# Patient Record
Sex: FEMALE | Race: ASIAN | Employment: FULL TIME | URBAN - METROPOLITAN AREA
[De-identification: names, ages, dates, MRNs, and addresses within clinical notes are randomized per-mention and may not be internally consistent; named-entity substitution may affect disease eponyms.]

---

## 2019-03-15 ENCOUNTER — HOSPITAL ENCOUNTER (EMERGENCY)
Facility: HOSPITAL | Age: 44
Discharge: HOME | End: 2019-03-15
Attending: EMERGENCY MEDICINE
Payer: COMMERCIAL

## 2019-03-15 ENCOUNTER — APPOINTMENT (OUTPATIENT)
Dept: GENERAL RADIOLOGY | Facility: HOSPITAL | Age: 44
End: 2019-03-15
Payer: COMMERCIAL

## 2019-03-15 VITALS
BODY MASS INDEX: 31.28 KG/M2 | DIASTOLIC BLOOD PRESSURE: 85 MMHG | HEART RATE: 94 BPM | TEMPERATURE: 98 F | SYSTOLIC BLOOD PRESSURE: 126 MMHG | HEIGHT: 62 IN | OXYGEN SATURATION: 99 % | RESPIRATION RATE: 18 BRPM | WEIGHT: 170 LBS

## 2019-03-15 DIAGNOSIS — S92.501A CLOSED FRACTURE OF PHALANX OF RIGHT FIFTH TOE, INITIAL ENCOUNTER: Primary | ICD-10-CM

## 2019-03-15 PROCEDURE — 28510 TREATMENT OF TOE FRACTURE: CPT

## 2019-03-15 PROCEDURE — 99284 EMERGENCY DEPT VISIT MOD MDM: CPT

## 2019-03-15 PROCEDURE — 73660 X-RAY EXAM OF TOE(S): CPT | Performed by: EMERGENCY MEDICINE

## 2019-03-15 RX ORDER — IBUPROFEN 600 MG/1
600 TABLET ORAL ONCE
Status: DISCONTINUED | OUTPATIENT
Start: 2019-03-15 | End: 2019-03-15

## 2019-03-15 RX ORDER — MELOXICAM 7.5 MG/1
7.5 TABLET ORAL DAILY
Qty: 14 TABLET | Refills: 0 | Status: SHIPPED | OUTPATIENT
Start: 2019-03-15 | End: 2019-03-29

## 2019-03-15 RX ORDER — IBUPROFEN 400 MG/1
400 TABLET ORAL ONCE
Status: COMPLETED | OUTPATIENT
Start: 2019-03-15 | End: 2019-03-15

## 2019-03-16 NOTE — ED PROVIDER NOTES
Patient Seen in: [facility] AND [facility] Emergency Department    History   Patient presents with:  Lower Extremity Injury (musculoskeletal)    Stated Complaint: r foot injury     HPI    36 yo F without PMH presenting for evaluation of right fifth toe pain after subungual hematoma noted. Neurological: Alert. RLE proximally and distally with 5/5 strength. Skin: Skin is warm. Psychiatric: Cooperative. Nursing note and vitals reviewed.         ED Course   Labs Reviewed - No data to display  Xr Toe(s) (min 2 Views 3/15/2019 10:42 PM    START taking these medications    Meloxicam 7.5 MG Oral Tab  Take 1 tablet (7.5 mg total) by mouth daily for 14 days. Avoid motrin(ibuprofen) and aleve(naproxen) while taking this medication. , Print Script, Disp-14 tablet, R-0

## 2019-06-12 ENCOUNTER — OFFICE VISIT (OUTPATIENT)
Dept: INTERNAL MEDICINE CLINIC | Facility: CLINIC | Age: 44
End: 2019-06-12
Payer: COMMERCIAL

## 2019-06-12 ENCOUNTER — LAB ENCOUNTER (OUTPATIENT)
Dept: LAB | Age: 44
End: 2019-06-12
Attending: INTERNAL MEDICINE
Payer: COMMERCIAL

## 2019-06-12 VITALS
HEIGHT: 62 IN | WEIGHT: 175.69 LBS | SYSTOLIC BLOOD PRESSURE: 102 MMHG | HEART RATE: 60 BPM | DIASTOLIC BLOOD PRESSURE: 70 MMHG | TEMPERATURE: 98 F | RESPIRATION RATE: 18 BRPM | BODY MASS INDEX: 32.33 KG/M2

## 2019-06-12 DIAGNOSIS — H91.93 DECREASED HEARING OF BOTH EARS: ICD-10-CM

## 2019-06-12 DIAGNOSIS — H90.6 MIXED HEARING LOSS, BILATERAL: ICD-10-CM

## 2019-06-12 DIAGNOSIS — Z12.31 SCREENING MAMMOGRAM, ENCOUNTER FOR: ICD-10-CM

## 2019-06-12 DIAGNOSIS — N92.6 IRREGULAR MENSES: ICD-10-CM

## 2019-06-12 DIAGNOSIS — E04.9 GOITER: ICD-10-CM

## 2019-06-12 DIAGNOSIS — Z00.00 PHYSICAL EXAM: ICD-10-CM

## 2019-06-12 DIAGNOSIS — Z00.00 PHYSICAL EXAM: Primary | ICD-10-CM

## 2019-06-12 PROCEDURE — 84443 ASSAY THYROID STIM HORMONE: CPT

## 2019-06-12 PROCEDURE — 80053 COMPREHEN METABOLIC PANEL: CPT

## 2019-06-12 PROCEDURE — 84703 CHORIONIC GONADOTROPIN ASSAY: CPT

## 2019-06-12 PROCEDURE — 99386 PREV VISIT NEW AGE 40-64: CPT | Performed by: INTERNAL MEDICINE

## 2019-06-12 PROCEDURE — 99212 OFFICE O/P EST SF 10 MIN: CPT | Performed by: INTERNAL MEDICINE

## 2019-06-12 PROCEDURE — 80061 LIPID PANEL: CPT

## 2019-06-12 PROCEDURE — 36415 COLL VENOUS BLD VENIPUNCTURE: CPT

## 2019-06-12 PROCEDURE — 85025 COMPLETE CBC W/AUTO DIFF WBC: CPT

## 2019-06-12 PROCEDURE — 81001 URINALYSIS AUTO W/SCOPE: CPT

## 2019-06-12 RX ORDER — FLUCONAZOLE 150 MG/1
150 TABLET ORAL ONCE
Qty: 1 TABLET | Refills: 1 | Status: SHIPPED | OUTPATIENT
Start: 2019-06-12 | End: 2019-06-12

## 2019-06-12 NOTE — PROGRESS NOTES
HPI:    Patient ID: Jude Heath is a 40year old female. Patient presents with:  Physical: Pt is presenting today as a new pt and for a physical     Patient is Obstetricians  - physician   Coming for  Physical exam   Patient presents today for physical exam, is midline and oropharynx is clear and moist. No oropharyngeal exudate or posterior oropharyngeal erythema. Eyes: Right eye exhibits no discharge. Left eye exhibits no discharge. No scleral icterus. Neck: Neck supple. No JVD present.  Thyromegaly (quest -   hearing  Check and visit   ENT - INTERNAL    5. Irregular menses  Pt  State  She is not  Pregnant   - HCG, BETA SUBUNIT, QUAL;  Future      Yeast  Inf vaginal  Pt is  Gynecologist   Obstetrician  And state not pregnant   Just need     Diflucan  occasion

## 2019-06-17 NOTE — PROGRESS NOTES
Please call patient with blood test results. Kidney and liver function are normal, no anemia.    Cholesterol is elevated triglycerides and LDL bad cholesterol-patient to decrease sugars and carbohydrates in the diet as well as saturated fat in the diet r

## 2019-12-03 ENCOUNTER — HOSPITAL ENCOUNTER (OUTPATIENT)
Age: 44
Discharge: HOME | End: 2019-12-03
Payer: COMMERCIAL

## 2019-12-03 VITALS
DIASTOLIC BLOOD PRESSURE: 70 MMHG | SYSTOLIC BLOOD PRESSURE: 95 MMHG | RESPIRATION RATE: 18 BRPM | WEIGHT: 170 LBS | BODY MASS INDEX: 31 KG/M2 | HEART RATE: 82 BPM | OXYGEN SATURATION: 99 % | TEMPERATURE: 98 F

## 2019-12-03 DIAGNOSIS — J10.1 INFLUENZA B: Primary | ICD-10-CM

## 2019-12-03 PROCEDURE — 99213 OFFICE O/P EST LOW 20 MIN: CPT

## 2019-12-03 PROCEDURE — 99214 OFFICE O/P EST MOD 30 MIN: CPT

## 2019-12-03 PROCEDURE — 87502 INFLUENZA DNA AMP PROBE: CPT | Performed by: NURSE PRACTITIONER

## 2019-12-03 RX ORDER — PSEUDOEPHEDRINE HYDROCHLORIDE 30 MG/1
30 TABLET ORAL EVERY 6 HOURS PRN
Qty: 1 PACKAGE | Refills: 0 | Status: SHIPPED | OUTPATIENT
Start: 2019-12-03

## 2019-12-03 RX ORDER — CODEINE PHOSPHATE AND GUAIFENESIN 10; 100 MG/5ML; MG/5ML
5 SOLUTION ORAL EVERY 6 HOURS PRN
Qty: 120 ML | Refills: 0 | Status: SHIPPED | OUTPATIENT
Start: 2019-12-03

## 2019-12-03 NOTE — ED INITIAL ASSESSMENT (HPI)
4-5 days of congestion and cough. Cough keeping her awake at night. OTC meds without relief. C/o frontal headache and facial pressure. Fever 101 yesterday. C/o weakness and fatigue. Started taking left over Augmentin the last 3 days.

## 2019-12-03 NOTE — ED PROVIDER NOTES
Patient presents with:  Cough/URI      HPI:     Elizabeth Bey is a 40year old female who presents for evaluation and management of a chief complaint of fevers as high as 101, postnasal drip, dry cough, body aches, chills, and fatigue for the past 4 to 5 days. file        Minutes per session: Not on file      Stress: Not on file    Relationships      Social connections:        Talks on phone: Not on file        Gets together: Not on file        Attends [employer] service: Not on file        Active member of [employer]      guaiFENesin-codeine (CHERATUSSIN AC) 100-10 MG/5ML Oral Solution          Sig: Take 5 mL by mouth every 6 (six) hours as needed for cough.           Dispense:  120 mL          Refill:  0      Pseudoephedrine HCl (SUDAFED) 30 MG Oral Tab          S

## 2022-05-14 ENCOUNTER — IMMUNIZATION (OUTPATIENT)
Dept: LAB | Age: 47
End: 2022-05-14
Attending: EMERGENCY MEDICINE
Payer: COMMERCIAL

## 2022-05-14 DIAGNOSIS — Z23 NEED FOR VACCINATION: Primary | ICD-10-CM

## 2022-05-14 PROCEDURE — 0054A SARSCOV2 VAC 30MCG TRS SUCR: CPT

## 2022-06-01 ENCOUNTER — OFFICE VISIT (OUTPATIENT)
Dept: INTERNAL MEDICINE CLINIC | Facility: CLINIC | Age: 47
End: 2022-06-01
Payer: COMMERCIAL

## 2022-06-01 VITALS
HEART RATE: 97 BPM | HEIGHT: 62 IN | BODY MASS INDEX: 33.86 KG/M2 | DIASTOLIC BLOOD PRESSURE: 72 MMHG | SYSTOLIC BLOOD PRESSURE: 106 MMHG | WEIGHT: 184 LBS

## 2022-06-01 DIAGNOSIS — Z91.89 AT RISK FOR INFECTIOUS DISEASE DUE TO RECENT FOREIGN TRAVEL: Primary | ICD-10-CM

## 2022-06-01 PROCEDURE — 90734 MENACWYD/MENACWYCRM VACC IM: CPT | Performed by: INTERNAL MEDICINE

## 2022-06-01 PROCEDURE — 90471 IMMUNIZATION ADMIN: CPT | Performed by: INTERNAL MEDICINE

## 2022-06-01 PROCEDURE — 3078F DIAST BP <80 MM HG: CPT | Performed by: INTERNAL MEDICINE

## 2022-06-01 PROCEDURE — 3074F SYST BP LT 130 MM HG: CPT | Performed by: INTERNAL MEDICINE

## 2022-06-01 PROCEDURE — 99213 OFFICE O/P EST LOW 20 MIN: CPT | Performed by: INTERNAL MEDICINE

## 2022-06-01 PROCEDURE — 3008F BODY MASS INDEX DOCD: CPT | Performed by: INTERNAL MEDICINE

## 2022-10-26 ENCOUNTER — OFFICE VISIT (OUTPATIENT)
Dept: OTOLARYNGOLOGY | Facility: CLINIC | Age: 47
End: 2022-10-26
Payer: COMMERCIAL

## 2022-10-26 DIAGNOSIS — H92.01 RIGHT EAR PAIN: Primary | ICD-10-CM

## 2022-10-26 PROCEDURE — 92504 EAR MICROSCOPY EXAMINATION: CPT | Performed by: STUDENT IN AN ORGANIZED HEALTH CARE EDUCATION/TRAINING PROGRAM

## 2022-10-26 PROCEDURE — 99203 OFFICE O/P NEW LOW 30 MIN: CPT | Performed by: STUDENT IN AN ORGANIZED HEALTH CARE EDUCATION/TRAINING PROGRAM

## 2023-01-19 NOTE — LETTER
6/12/2019              Korin Cortes 1         To Whom It May Concern,    Liam Caal was seen in the office today and had a physical examination.  If any additional questions is needed please contact our of no

## 2023-07-19 ENCOUNTER — OFFICE VISIT (OUTPATIENT)
Dept: AUDIOLOGY | Facility: CLINIC | Age: 48
End: 2023-07-19

## 2023-07-19 DIAGNOSIS — H90.3 SENSORINEURAL HEARING LOSS, BILATERAL: Primary | ICD-10-CM

## 2023-07-19 DIAGNOSIS — H90.6 HEARING LOSS, MIXED, BILATERAL: ICD-10-CM

## 2023-09-27 ENCOUNTER — OFFICE VISIT (OUTPATIENT)
Dept: AUDIOLOGY | Facility: CLINIC | Age: 48
End: 2023-09-27

## 2023-09-27 DIAGNOSIS — H90.6 MIXED HEARING LOSS, BILATERAL: Primary | ICD-10-CM

## 2023-09-27 PROCEDURE — 92591 HEARING AID EXAM, BOTH EARS: CPT | Performed by: AUDIOLOGIST

## 2023-09-28 NOTE — PROGRESS NOTES
Hearing Aid Evaluation    Jared Lepe  4/4/1975  [id number]    Jared Lepe is an experienced hearing aid user. She currently has Resound ITC hearing aids with helix microphones obtained elsewere (2016?). 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 I re-iterated that patient may benefit from ENT consultation due to air-bone gaps and could investigate other options for addressing this hearing loss. She again declined to schedule with ENT noting that she has done so in the past.   Patient signed a medical waiver form today. Patient states that she does not wear her hearing aids full time and really only wears at work (3 days week). She never wears the hearing aids at home and feels that she does okay in most situations. She states that her family is aware of the need to come and get her attention if they want to talk to her. She does not watch much TV/movies, preferring to read. Discussed expectations for hearing aid use and acclimatization. (Listening check of hearing aids shows that she is not getting much gain from aids in their current condition). She is not interested in bluetooth connectivity and wants to stay with small style ITC like current aids. She does wear a head scarf so we will try and have faceplate recessed enough into ear canal that wesley noise is not a problem. Does not want BTE style. Does not want rechargeable. Current aids have 312 battery so we will stay with that size.      Explanation of Audiogram  Patient's hearing loss  Communication difficulties  Importance of binaural instruments  Performance of noise  Benefits/limitations of style  Adjustment period and follow up visits    Hearing Aid Features  Automatic selection  Dual microphones  Noise suppression  Compatible with Bluetooth    Charges Associated with Hearing Aids  Hearing aid evaluation  Hearing aid(s)  Custom earmolds  Payment in full at delivery  Batteries  Office visits after service agreement ends    Medical waiver was signed. Patient's Choice  Level of technology: Advanced  Fitting: binaural  Phonak Virto   To consider Non-wireless if this will allow them to reduce size since patient does not want bluetooth. Beige color  312 battery.        Hearing Aid Ordered  Patient has sent an appointment for  on November 1.    9/28/2023  Brigette Campbell

## 2023-11-01 ENCOUNTER — OFFICE VISIT (OUTPATIENT)
Dept: AUDIOLOGY | Facility: CLINIC | Age: 48
End: 2023-11-01

## 2023-11-01 DIAGNOSIS — H90.6 MIXED HEARING LOSS, BILATERAL: Primary | ICD-10-CM

## 2023-11-01 PROCEDURE — V5259 HEARING AID, DIGIT, BIN, ITC: HCPCS | Performed by: AUDIOLOGIST

## 2023-11-01 NOTE — PROGRESS NOTES
Hearing Aid Fitting    Korin Fairchildhermilo Manjarrez purchased two hearing aids. Hearing Aid Information  Phonak Virto   Non-wireless 0  Right #[id number]  Left #[id number]  Date dispensed:  11-1-23  Battery:  312  Wax guard:  cerustop      Completed REM and fitting. Patient wanted to have program button on hearing aid and would also benefit from retrieval line. Asked her to wear for a few days and see how physical fit is in ear before we attempt to change these things so we can address all issues at once. Feels like left  may be a little tight. She would like to be able to adjust for a few different volume levels. We may want to change the non-wireless to wireless so that the two aids can communicate with each other for synced volume adjustments. Patient has not wanted to have phone connectivity. The following items were demonstrated to the patient:  Insertion/removal of hearing aid into ear  Insertion/removal of battery  Adjustment of volume  Changing programs  Function and operation of controls  Telephone use  Cleaning of hearing aid/earmold  Storage of hearing aids  Use of accessories  Remote control operation    The patient was informed of or received the following:  Adjustment period and realistic expectations  Battery size  Battery ingestion warning  Cleaning tools  Hearing aid instruction book   Carrying case  Purchase agreement  Repair/loss coverage  Trial period/return policy  Service period  Medical clearance    Sent patient a [employer] message so that she can respond easily to any hearing aid issues as needed. Patient expressed understanding to all discussed topics. Follow-up scheduled for 2 weeks.       11/01/23  Brigette Bishop

## 2023-11-15 ENCOUNTER — OFFICE VISIT (OUTPATIENT)
Dept: AUDIOLOGY | Facility: CLINIC | Age: 48
End: 2023-11-15

## 2023-11-15 DIAGNOSIS — H90.6 MIXED HEARING LOSS, BILATERAL: Primary | ICD-10-CM

## 2023-11-15 PROCEDURE — 92593 HEARING AID CHECK, BOTH EARS: CPT | Performed by: AUDIOLOGIST

## 2023-11-15 NOTE — PROGRESS NOTES
HEARING AID FOLLOW-UP    Jeroinmo Snow  4/4/1975  [id number]    Patient is here for first follow up after new hearing aid fitting. Hearing Aid Information  Yary Hensley   Non-wireless 0  Right #[id number]  Left #[id number]  Date dispensed:  11-1-23  Battery:  312  Wax guard:  cerustop          The patient has the following concerns:   Overall feels like they are too loud. Wondered about consequences of having too loud. Wondered if we need to re-check her hearing. In office the following actions were taken:  Reassured that REM is appropriate, but that we could decrease overall gain for comfort. Decreased by 3dB overall. Discussed having aids re-made with program button. (Per Phonak this will result in aid being slightly larger)  She has adjusted to insertion/removal without retrieval line. Not sure that she wants aids remade with program button. She will monitor new loudness and see if this is better. She will consider having aids remade with program button, but not ready to do so at this time. Reminded of 45 day trial period. Patient is to follow up:  Set up one and two week follow ups. Patient will cancel if not needed.      11/15/2023  Brigette Villarreal

## 2023-11-29 ENCOUNTER — OFFICE VISIT (OUTPATIENT)
Dept: AUDIOLOGY | Facility: CLINIC | Age: 48
End: 2023-11-29

## 2023-11-29 DIAGNOSIS — H90.6 MIXED HEARING LOSS, BILATERAL: Primary | ICD-10-CM

## 2023-11-29 PROCEDURE — 92593 HEARING AID CHECK, BOTH EARS: CPT | Performed by: AUDIOLOGIST

## 2023-11-29 NOTE — PROGRESS NOTES
HEARING AID FOLLOW-UP    Mac Yasmin  4/4/1975  [id number]    Patient is here for second follow up visit. Hearing Aid Information  Akshat Mcnally   Non-wireless 0  Right #[id number]  Left #[id number]  Date dispensed:  11-1-23  Battery:  312  Wax guard:  cerustop      The patient has the following concerns:  Patient feels changed made a last visit to reduce gain by 3dB were overall good. She still feels that the aids might be slightly loud, but declined any further changes. Declined to pursue program button on aids. In office the following actions were taken:  Read aids in [facility] and found almost 10 hours avg daily use. Patient is to follow up as needed or in one year for annual check.      11/29/2023  Brigette Cabrera

## 2023-12-25 ENCOUNTER — HOSPITAL ENCOUNTER (EMERGENCY)
Facility: HOSPITAL | Age: 48
Discharge: HOME | End: 2023-12-25
Attending: EMERGENCY MEDICINE
Payer: COMMERCIAL

## 2023-12-25 ENCOUNTER — APPOINTMENT (OUTPATIENT)
Dept: CT IMAGING | Facility: HOSPITAL | Age: 48
End: 2023-12-25
Attending: EMERGENCY MEDICINE
Payer: COMMERCIAL

## 2023-12-25 VITALS
HEART RATE: 81 BPM | WEIGHT: 170 LBS | DIASTOLIC BLOOD PRESSURE: 86 MMHG | RESPIRATION RATE: 18 BRPM | TEMPERATURE: 98 F | HEIGHT: 63 IN | OXYGEN SATURATION: 100 % | SYSTOLIC BLOOD PRESSURE: 147 MMHG | BODY MASS INDEX: 30.12 KG/M2

## 2023-12-25 DIAGNOSIS — R51.9 ACUTE NONINTRACTABLE HEADACHE, UNSPECIFIED HEADACHE TYPE: Primary | ICD-10-CM

## 2023-12-25 PROCEDURE — 70450 CT HEAD/BRAIN W/O DYE: CPT | Performed by: EMERGENCY MEDICINE

## 2023-12-25 PROCEDURE — 99284 EMERGENCY DEPT VISIT MOD MDM: CPT

## 2023-12-25 RX ORDER — DIPHENHYDRAMINE HCL 25 MG
50 CAPSULE ORAL ONCE
Status: DISCONTINUED | OUTPATIENT
Start: 2023-12-25 | End: 2023-12-25

## 2023-12-25 RX ORDER — BUTALBITAL, ACETAMINOPHEN AND CAFFEINE 50; 325; 40 MG/1; MG/1; MG/1
1-2 TABLET ORAL EVERY 4 HOURS PRN
Qty: 20 TABLET | Refills: 0 | Status: SHIPPED | OUTPATIENT
Start: 2023-12-25

## 2023-12-25 RX ORDER — ACETAMINOPHEN 500 MG
1000 TABLET ORAL ONCE
Status: COMPLETED | OUTPATIENT
Start: 2023-12-25 | End: 2023-12-25

## 2024-11-18 ENCOUNTER — OFFICE VISIT (OUTPATIENT)
Dept: DERMATOLOGY CLINIC | Facility: CLINIC | Age: 49
End: 2024-11-18

## 2024-11-18 DIAGNOSIS — L72.9 CYST OF SKIN: Primary | ICD-10-CM

## 2024-11-18 NOTE — PROGRESS NOTES
HPI:    Patient ID: Korin Murphy is a 49 year old female.    Patient presents for growth on the back of the neck for the past 2-3 weeks. Feels the growth is getting smaller. Not using any treatment. Patient denies any personal or family hx of skin cancer. No draining or tenderness noted. No allergies to medications noted.         Review of Systems   Constitutional:  Negative for chills and fever.   Musculoskeletal:  Negative for arthralgias and myalgias.   Skin:  Positive for rash. Negative for color change and wound.            Current Outpatient Medications   Medication Sig Dispense Refill    butalbital-acetaminophen-caffeine -40 MG Oral Tab Take 1-2 tablets by mouth every 4 (four) hours as needed for Headaches. (Patient not taking: Reported on 11/18/2024) 20 tablet 0    guaiFENesin-codeine (CHERATUSSIN AC) 100-10 MG/5ML Oral Solution Take 5 mL by mouth every 6 (six) hours as needed for cough. (Patient not taking: Reported on 6/1/2022) 120 mL 0    Pseudoephedrine HCl (SUDAFED) 30 MG Oral Tab Take 1 tablet (30 mg total) by mouth every 6 (six) hours as needed for congestion. (Patient not taking: Reported on 6/1/2022) 1 Package 0     Allergies:Allergies[1]   There were no vitals taken for this visit.  There is no height or weight on file to calculate BMI.  PHYSICAL EXAM:   Physical Exam  Constitutional:       General: She is not in acute distress.     Appearance: Normal appearance.   Skin:     General: Skin is warm and dry.      Findings: Rash present.      Comments: Small cystic lesion noted on the upper left back. No draining or tenderness noted. About 4 mm in size. Soft and mobile.    Neurological:      Mental Status: She is alert and oriented to person, place, and time.                ASSESSMENT/PLAN:   1. Cyst of skin  -After discussion with patient, advised the following:  -See general surgery for removal   -Patient is concerned about scarring. Since patient is concerned, I would advise that she see general  surgery for removal.   -Gave information.   -To call or follow-up with worsening symptoms or concerns  -Patient was agreeable to plan and will comply with discussion above.         No orders of the defined types were placed in this encounter.      Meds This Visit:  Requested Prescriptions      No prescriptions requested or ordered in this encounter       Imaging & Referrals:  None         ID#[id number]       [1] No Known Allergies

## 2024-11-27 ENCOUNTER — NURSE ONLY (OUTPATIENT)
Dept: SURGERY | Facility: CLINIC | Age: 49
End: 2024-11-27
Payer: COMMERCIAL

## 2024-11-27 VITALS
WEIGHT: 170 LBS | HEART RATE: 69 BPM | BODY MASS INDEX: 30 KG/M2 | DIASTOLIC BLOOD PRESSURE: 74 MMHG | SYSTOLIC BLOOD PRESSURE: 112 MMHG

## 2024-11-27 DIAGNOSIS — M79.89 SOFT TISSUE MASS: Primary | ICD-10-CM

## 2024-11-27 PROCEDURE — 99202 OFFICE O/P NEW SF 15 MIN: CPT

## 2024-11-27 NOTE — H&P
HPI:    Patient ID: Korin Murphy is a 49 year old female presenting with   Chief Complaint   Patient presents with    Cyst     Patient is here for complaints of a cyst on the upper back near the neck.  States it is painful and is growing.       Korni is a 48yo female presenting for consultation from Shahana Murphy PA-C concerning a left upper back cyst. Present for around 3 weeks, slowly growing in size and becoming increasingly painful. No drainage, no fever or chills. She hasn't taken antibiotics. She desires excision.    Past Medical History:    Obesity    ROSA (obstructive sleep apnea)    AHI 5, REM AHI 19, SaO2 azra 85%    Sleep apnea     Past Surgical History:   Procedure Laterality Date    Appendectomy  5/21/2010     Family History   Problem Relation Age of Onset    Hypertension Mother     Diabetes Mother      Social History     Socioeconomic History    Marital status:      Spouse name: Not on file    Number of children: Not on file    Years of education: Not on file    Highest education level: Not on file   Occupational History    Not on file   Tobacco Use    Smoking status: Never    Smokeless tobacco: Never   Vaping Use    Vaping status: Never Used   Substance and Sexual Activity    Alcohol use: No    Drug use: No    Sexual activity: Not on file   Other Topics Concern    Grew up on a farm Not Asked    History of tanning Not Asked    Outdoor occupation Not Asked    Breast feeding No    Reaction to local anesthetic No    Pt has a pacemaker No    Pt has a defibrillator No   Social History Narrative    Pt is an Ob/Gyne        Working    2 kids--12 and 10 y/o        Diet: more veggies, more salads, lots of protein    Exercise: not much--walking, stamina low    Sleep: ok    Stress: high, but manageable     Social Drivers of Health     Financial Resource Strain: Not on file   Food Insecurity: Not on file   Transportation Needs: Not on file   Physical Activity: Not on file   Stress: Not on file   Social  Connections: Not on file   Housing Stability: Not on file       Review of Systems   Constitutional:  Negative for chills, diaphoresis and fever.   Respiratory:  Negative for shortness of breath.    Cardiovascular:  Negative for chest pain.   Gastrointestinal:  Negative for abdominal distention, abdominal pain, constipation, diarrhea, nausea and vomiting.   Neurological:  Negative for weakness.           Current Outpatient Medications   Medication Sig Dispense Refill    butalbital-acetaminophen-caffeine -40 MG Oral Tab Take 1-2 tablets by mouth every 4 (four) hours as needed for Headaches. 20 tablet 0    guaiFENesin-codeine (CHERATUSSIN AC) 100-10 MG/5ML Oral Solution Take 5 mL by mouth every 6 (six) hours as needed for cough. 120 mL 0    Pseudoephedrine HCl (SUDAFED) 30 MG Oral Tab Take 1 tablet (30 mg total) by mouth every 6 (six) hours as needed for congestion. 1 Package 0       Allergies:Allergies[1]   PHYSICAL EXAM:   /74 (BP Location: Right arm, Patient Position: Sitting, Cuff Size: large)   Pulse 69   Wt 170 lb (77.1 kg)   BMI 30.11 kg/m²   Physical Exam  Constitutional:       General: She is not in acute distress.     Appearance: Normal appearance.   HENT:      Head: Normocephalic and atraumatic.      Right Ear: External ear normal.      Left Ear: External ear normal.      Nose: Nose normal.   Pulmonary:      Effort: Pulmonary effort is normal. No respiratory distress.   Abdominal:      General: There is no distension.      Palpations: Abdomen is soft.      Tenderness: There is no abdominal tenderness. There is no guarding or rebound.   Skin:     Comments: 5mm soft tissue mass on left upper back. Non-tender, non-erythematous   Neurological:      Mental Status: She is alert and oriented to person, place, and time.   Psychiatric:         Mood and Affect: Mood normal.         Behavior: Behavior normal.         Thought Content: Thought content normal.         Judgment: Judgment normal.                ASSESSMENT/PLAN:   Diagnoses and all orders for this visit:    Soft tissue mass    Likely cyst given clinical presentation. Plan in-office excision. Explained procedure, including risks, complications, and expected follow up. She is understanding and willing to proceed.         Narinder Duque PA-C  11/27/2024         [1] No Known Allergies

## 2024-12-11 ENCOUNTER — NURSE ONLY (OUTPATIENT)
Dept: SURGERY | Facility: CLINIC | Age: 49
End: 2024-12-11
Payer: COMMERCIAL

## 2024-12-11 VITALS
BODY MASS INDEX: 30 KG/M2 | HEART RATE: 77 BPM | WEIGHT: 170 LBS | SYSTOLIC BLOOD PRESSURE: 111 MMHG | DIASTOLIC BLOOD PRESSURE: 52 MMHG

## 2024-12-11 DIAGNOSIS — M79.89 SOFT TISSUE MASS: Primary | ICD-10-CM

## 2024-12-11 PROCEDURE — 88304 TISSUE EXAM BY PATHOLOGIST: CPT

## 2024-12-11 PROCEDURE — 88305 TISSUE EXAM BY PATHOLOGIST: CPT

## 2024-12-11 NOTE — PROGRESS NOTES
Follow Up Visit Note       Active Problems      1. Soft tissue mass          Chief Complaint   Chief Complaint   Patient presents with    Procedure     Patient is here for a procedure.  States she feels well.         History of Present Illness  Korin is a pleasant 49 year old year old patient presenting for removal of back cyst. She feels well, no new complaints or changes since seeing DAHIANA Allen 11/27/24.      Allergies  Korin has No Known Allergies.    Past Medical / Surgical / Social / Family History    The past medical and past surgical history have been reviewed by me today.    Past Medical History:    Obesity    ROSA (obstructive sleep apnea)    AHI 5, REM AHI 19, SaO2 azra 85%    Sleep apnea     Past Surgical History:   Procedure Laterality Date    Appendectomy  5/21/2010       The family history and social history have been reviewed by me today.    Family History   Problem Relation Age of Onset    Hypertension Mother     Diabetes Mother      Social History     Socioeconomic History    Marital status:    Tobacco Use    Smoking status: Never    Smokeless tobacco: Never   Vaping Use    Vaping status: Never Used   Substance and Sexual Activity    Alcohol use: No    Drug use: No   Other Topics Concern    Breast feeding No    Reaction to local anesthetic No    Pt has a pacemaker No    Pt has a defibrillator No        Current Outpatient Medications:     butalbital-acetaminophen-caffeine -40 MG Oral Tab, Take 1-2 tablets by mouth every 4 (four) hours as needed for Headaches., Disp: 20 tablet, Rfl: 0    guaiFENesin-codeine (CHERATUSSIN AC) 100-10 MG/5ML Oral Solution, Take 5 mL by mouth every 6 (six) hours as needed for cough., Disp: 120 mL, Rfl: 0    Pseudoephedrine HCl (SUDAFED) 30 MG Oral Tab, Take 1 tablet (30 mg total) by mouth every 6 (six) hours as needed for congestion., Disp: 1 Package, Rfl: 0     Review of Systems  The Review of Systems has been reviewed by me during today.  Review of  Systems   Constitutional:  Negative for chills, fatigue and fever.   Respiratory:  Negative for shortness of breath.    Cardiovascular:  Negative for chest pain and leg swelling.   Gastrointestinal:  Negative for abdominal pain, constipation, diarrhea, nausea and vomiting.   Skin:         Cyst on upper left back        Physical Findings   /52 (BP Location: Left arm, Patient Position: Sitting, Cuff Size: large)   Pulse 77   Wt 170 lb (77.1 kg)   BMI 30.11 kg/m²   Physical Exam  Constitutional:       General: She is not in acute distress.     Appearance: She is not ill-appearing.   HENT:      Head: Normocephalic and atraumatic.   Eyes:      Extraocular Movements: Extraocular movements intact.      Conjunctiva/sclera: Conjunctivae normal.      Pupils: Pupils are equal, round, and reactive to light.   Abdominal:      General: Abdomen is flat. There is no distension.      Palpations: Abdomen is soft.      Tenderness: There is no abdominal tenderness.   Skin:     General: Skin is warm.      Comments: incision C/D/I.   Neurological:      Mental Status: She is alert.          Assessment   1. Soft tissue mass      Procedure Note  The risks, benefits, alternatives and expected recovery were explained.  The pt expressed understanding and wished to proceed with the procedure.      Preop: Left upper back cyst, 0.5 cm x 0.5 cm  Postop:  Same  Procedure: Removal of left upper back cyst  Anesthesia:  5 mL lidocaine  EBL:  Minimal  Description:  The skin was prepped and draped in sterile fashion. Local anesthesia was injected. A #15 blade was used to make a small incision. Hemostats and scissors used to dissect cyst sac from tissue. Specimen placed in formalin. Hemostasis was maintained. 2# 4-0 prolene sutures placed. The patient tolerated the procedure well. The wound was dressed with gauze and Tegaderm .        Plan   Remove bandage tomorrow, may leave uncovered. OK to shower, no soaking or swimming.  Tylenol or ibuprofen  for pain  Follow up 1 week to remove sutures.       Orders Placed This Encounter   Procedures    Specimen to Pathology, Tissue       Imaging & Referrals   None    Follow Up  No follow-ups on file.    DAHIANA Sahu

## 2024-12-18 ENCOUNTER — NURSE ONLY (OUTPATIENT)
Dept: SURGERY | Facility: CLINIC | Age: 49
End: 2024-12-18
Payer: COMMERCIAL

## 2024-12-18 VITALS — DIASTOLIC BLOOD PRESSURE: 64 MMHG | HEART RATE: 102 BPM | SYSTOLIC BLOOD PRESSURE: 117 MMHG

## 2024-12-18 DIAGNOSIS — Z48.89 ENCOUNTER FOR POSTOPERATIVE CARE: Primary | ICD-10-CM

## 2024-12-18 PROCEDURE — 99024 POSTOP FOLLOW-UP VISIT: CPT

## 2024-12-18 RX ORDER — CEPHALEXIN 500 MG/1
500 CAPSULE ORAL 4 TIMES DAILY
Qty: 20 CAPSULE | Refills: 0 | Status: SHIPPED | OUTPATIENT
Start: 2024-12-18 | End: 2024-12-23

## 2024-12-18 NOTE — PROGRESS NOTES
Follow Up Visit Note       Active Problems      1. Encounter for postoperative care          Chief Complaint   Chief Complaint   Patient presents with    Post-Op     S/P excision of upper back cyst on 12/11/24. Pt states last couple of days there has been some pain, pain level 2-3/10, slight drainage. Pt denies fever.          History of Present Illness  Korin is a pleasant 49 year old year old patient presenting for post op appointment. She generally feels well, she denies incisional pain. She developed a contact dermatitis from the bandage. She noted some minimal discharge from the incision site that has been improving over the last few days. She denies fever or chills      Allergies  Korin has No Known Allergies.    Past Medical / Surgical / Social / Family History    The past medical and past surgical history have been reviewed by me today.    Past Medical History:    Obesity    ROSA (obstructive sleep apnea)    AHI 5, REM AHI 19, SaO2 azra 85%    Sleep apnea     Past Surgical History:   Procedure Laterality Date    Appendectomy  5/21/2010       The family history and social history have been reviewed by me today.    Family History   Problem Relation Age of Onset    Hypertension Mother     Diabetes Mother      Social History     Socioeconomic History    Marital status:    Tobacco Use    Smoking status: Never    Smokeless tobacco: Never   Vaping Use    Vaping status: Never Used   Substance and Sexual Activity    Alcohol use: No    Drug use: No   Other Topics Concern    Breast feeding No    Reaction to local anesthetic No    Pt has a pacemaker No    Pt has a defibrillator No        Current Outpatient Medications:     cephALEXin (KEFLEX) 500 MG Oral Cap, Take 1 capsule (500 mg total) by mouth 4 (four) times daily for 5 days., Disp: 20 capsule, Rfl: 0    butalbital-acetaminophen-caffeine -40 MG Oral Tab, Take 1-2 tablets by mouth every 4 (four) hours as needed for Headaches., Disp: 20 tablet, Rfl: 0     guaiFENesin-codeine (CHERATUSSIN AC) 100-10 MG/5ML Oral Solution, Take 5 mL by mouth every 6 (six) hours as needed for cough., Disp: 120 mL, Rfl: 0    Pseudoephedrine HCl (SUDAFED) 30 MG Oral Tab, Take 1 tablet (30 mg total) by mouth every 6 (six) hours as needed for congestion., Disp: 1 Package, Rfl: 0     Review of Systems  The Review of Systems has been reviewed by me during today.  Review of Systems   Constitutional:  Negative for chills, fatigue and fever.   Respiratory:  Negative for shortness of breath.    Cardiovascular:  Negative for chest pain and leg swelling.   Gastrointestinal:  Negative for abdominal pain, constipation, diarrhea, nausea and vomiting.        Physical Findings   /64   Pulse 102   Physical Exam  Constitutional:       General: She is not in acute distress.     Appearance: She is not ill-appearing.   HENT:      Head: Normocephalic and atraumatic.   Eyes:      Extraocular Movements: Extraocular movements intact.      Conjunctiva/sclera: Conjunctivae normal.      Pupils: Pupils are equal, round, and reactive to light.   Abdominal:      General: Abdomen is flat. There is no distension.      Palpations: Abdomen is soft.      Tenderness: There is no abdominal tenderness.   Skin:     General: Skin is warm.      Comments: incision C/D/I.   Neurological:      Mental Status: She is alert.          Assessment   1. Encounter for postoperative care      Pathology reviewed with the patient    Plan   Follow up as needed       No orders of the defined types were placed in this encounter.      Imaging & Referrals   None    Follow Up  No follow-ups on file.    DAHIANA Sahu

## (undated) NOTE — LETTER
07/15/19        [address] [facility]      Dear Tori Chase records indicate that you have outstanding lab work and or testing that was ordered for you and has not yet been completed:  Orders Placed This Encounter      CBC W

## (undated) NOTE — LETTER
6/12/2019              [address] [facility],  [address]         To Whom It May Concern,  Korin was seen in the office today and had a physical examination.  I f any additional information please contact our office at [phone]

## (undated) NOTE — ED AVS SNAPSHOT
Jimenez Abernathy   MRN: [id number]    Department:  [facility] Emergency Department   Date of Visit:  3/15/2019           Disclosure     Insurance plans vary and the physician(s) referred by the ER may not be covered by your plan.  Please contact your i CARE PHYSICIAN AT ONCE OR RETURN IMMEDIATELY TO THE EMERGENCY DEPARTMENT. If you have been prescribed any medication(s), please fill your prescription right away and begin taking the medication(s) as directed.   If you believe that any of the medications

## (undated) NOTE — LETTER
6/1/2022          To Whom It May Concern:    Bridgett López is currently under my medical care and received the standard recommended dose of the Menveo meningitis vaccine on 6/1/22. If you require additional information please contact our office.         Sincerely,    Roger Peacock MD          Document generated by:  Roger Peacock MD

## (undated) NOTE — LETTER
09/16/19        [address]      Dear Duong Najera records indicate that you have outstanding lab work and or testing that was ordered for you and has not yet been completed:  Orders Placed This Encounter      CBC W

## (undated) NOTE — LETTER
Date & Time: 12/3/2019, 11:17 AM  Patient: Addi Chakraborty  Encounter Provider(s):    DARIUS Benitez       To Whom It May Concern:    Addi Chakraborty was seen and treated in our department on 12/3/2019.   If you have any questions or concerns, please do not hes